# Patient Record
Sex: FEMALE | Race: WHITE | NOT HISPANIC OR LATINO | Employment: STUDENT | URBAN - METROPOLITAN AREA
[De-identification: names, ages, dates, MRNs, and addresses within clinical notes are randomized per-mention and may not be internally consistent; named-entity substitution may affect disease eponyms.]

---
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
## 2023-11-13 ENCOUNTER — TELEPHONE (OUTPATIENT)
Dept: NEUROSURGERY | Facility: CLINIC | Age: 18
End: 2023-11-13
Payer: MEDICARE

## 2023-11-13 NOTE — TELEPHONE ENCOUNTER
----- Message from Luda Duke sent at 11/13/2023  9:27 AM CST -----  Type:  Patient Returning Call    Who Called:pt   Who Left Message for Patient:  Does the patient know what this is regarding?:appt reschedule   Would the patient rather a call back or a response via [facility]? [facility]   Best Call Back Number:phone is broken   Additional Information: pt would like to reschedule appt

## 2023-11-13 NOTE — TELEPHONE ENCOUNTER
Lvm with mother of the patient, appt has been RSLD to Monday, 11-28 at 1:00 pm. Appt slip is in the mail.

## 2024-04-02 ENCOUNTER — OFFICE VISIT (OUTPATIENT)
Dept: NEUROSURGERY | Facility: CLINIC | Age: 19
End: 2024-04-02
Payer: MEDICARE

## 2024-04-02 DIAGNOSIS — G80.9 CEREBRAL PALSY, UNSPECIFIED TYPE: ICD-10-CM

## 2024-04-02 DIAGNOSIS — Q03.9 CONGENITAL HYDROCEPHALUS: Primary | ICD-10-CM

## 2024-04-02 DIAGNOSIS — Z98.2 VP (VENTRICULOPERITONEAL) SHUNT STATUS: ICD-10-CM

## 2024-04-02 PROCEDURE — 99213 OFFICE O/P EST LOW 20 MIN: CPT | Mod: PBBFAC | Performed by: PHYSICIAN ASSISTANT

## 2024-04-02 PROCEDURE — 99999 PR PBB SHADOW E&M-EST. PATIENT-LVL III: CPT | Mod: PBBFAC,,, | Performed by: PHYSICIAN ASSISTANT

## 2024-04-02 PROCEDURE — 99204 OFFICE O/P NEW MOD 45 MIN: CPT | Mod: S$PBB,,, | Performed by: PHYSICIAN ASSISTANT

## 2024-04-02 RX ORDER — BACLOFEN 20 MG/1
1 TABLET ORAL 2 TIMES DAILY
COMMUNITY
Start: 2023-12-15

## 2024-04-02 RX ORDER — LUMATEPERONE 42 MG/1
CAPSULE ORAL
COMMUNITY
Start: 2023-06-30

## 2024-04-02 RX ORDER — ALPRAZOLAM 3 MG/1
3 TABLET, EXTENDED RELEASE ORAL DAILY
COMMUNITY
Start: 2024-01-27

## 2024-04-02 RX ORDER — LEVONORGESTREL / ETHINYL ESTRADIOL AND ETHINYL ESTRADIOL 150-30(84)
1 KIT ORAL DAILY
COMMUNITY
Start: 2024-03-26

## 2024-04-02 RX ORDER — OXCARBAZEPINE 150 MG/1
1 TABLET, FILM COATED ORAL 2 TIMES DAILY
COMMUNITY
Start: 2023-06-30

## 2024-04-02 RX ORDER — DIAZEPAM 20 MG/4G
GEL RECTAL
COMMUNITY
Start: 2023-05-27

## 2024-04-02 RX ORDER — ASCORBIC ACID 125 MG
1000 TABLET,CHEWABLE ORAL
COMMUNITY

## 2024-04-02 RX ORDER — MOMETASONE FUROATE 1 MG/ML
SOLUTION TOPICAL
COMMUNITY
Start: 2023-08-17

## 2024-04-02 RX ORDER — KETOCONAZOLE 20 MG/ML
SHAMPOO, SUSPENSION TOPICAL
COMMUNITY

## 2024-04-02 RX ORDER — FERROUS SULFATE 324(65)MG
324 TABLET, DELAYED RELEASE (ENTERIC COATED) ORAL
COMMUNITY

## 2024-04-02 RX ORDER — DIAZEPAM 5 MG/1
5 TABLET ORAL 2 TIMES DAILY PRN
COMMUNITY
Start: 2023-06-30

## 2024-04-02 NOTE — PROGRESS NOTES
Neurosurgery  New Patient    SUBJECTIVE:     History of Present Illness:  18-year-old female with history of cerebral palsy, congenital hydrocephalus with bilateral  shunt, and seizure disorder.  Patient here accompanied by her mother, who states overall she has been doing well at her baseline, they are here to establish care.  Patient was previously patient of Dr. Cardoza in [place].  Patient had shunt placement initially placed by Dr. Cardoza at birth, and ultimately a 2nd shunt placed at 8 months of age.  Since that time has not required further  shunt revisions.  Patient is followed by Neurology at outside facility.  Mom states that seizures have been well controlled and not had a seizure in years.  Patient with some complaints of intermittent floaters in vision. Denies constant/progressive HAs, blurred vision or diplopia, speech difficulty, weakness, numbness, seizures, or balance difficulty.       Review of patient's allergies indicates:  No Known Allergies    Current Outpatient Medications   Medication Sig Dispense Refill    ALPRAZolam (XANAX XR) 3 MG Tb24 Take 3 mg by mouth once daily.      baclofen (LIORESAL) 20 MG tablet Take 1 tablet by mouth 2 (two) times daily.      CAPLYTA 42 mg Cap TAKE 1 CAPSULE BY MOUTH DAILY AT BEDTIME      cholecalciferol, vitamin D3, 25 mcg (1,000 unit) Chew Take 1,000 Units by mouth.      cloBAZam (ONFI) 10 mg Tab Take 10 mg by mouth 2 (two) times daily.      ferrous sulfate 324 mg (65 mg iron) TbEC Take 324 mg by mouth with breakfast.      ketoconazole (NIZORAL) 2 % shampoo APPLY TO THE SCALP EVERY OTHER DAY, LATHER, SIT, RINSE NEED APPOINTMENT      L norgest/e.estradioL-e.estrad (SEASONIQUE) 0.15 mg-30 mcg (84)/10 mcg (7) 3MPk Take 1 tablet by mouth once daily.      mometasone (ELOCON) 0.1 % solution apply 1ml to the scalp TWICE DAILY FOR TWO WEEKS, then AS NEEDED      OXcarbazepine (TRILEPTAL) 150 MG Tab Take 1 tablet by mouth 2 (two) times daily.      oxcarbazepine  (TRILEPTAL) 600 MG Tab Take 150 mg by mouth 2 (two) times daily. Take 600 mg and 150 mg tablet      pantoprazole (PROTONIX) 40 MG tablet Take 40 mg by mouth once daily.      potassium citrate (UROCIT-K) 10 mEq (1,080 mg) TbSR Take 10 mEq by mouth 2 (two) times daily.      propranoloL (INDERAL) 60 MG tablet Take 60 mg by mouth 2 (two) times a day.      sertraline (ZOLOFT) 100 MG tablet Take 200 mg by mouth every evening.      celecoxib (CELEBREX) 100 MG capsule Take 1 capsule (100 mg total) by mouth once daily. (Patient not taking: Reported on 10/11/2022) 30 capsule 0    diazePAM (DIASTAT ACUDIAL) 12.5-15-17.5-20 mg Kit 20 mg per rectum if seizure lasts more than 5 minutes      diazePAM (VALIUM) 5 MG tablet Take 5 mg by mouth 2 (two) times daily as needed.      Lactobacillus rhamnosus GG (CULTURELLE) 10 billion cell capsule Take 1 capsule by mouth once daily. (Patient not taking: Reported on 10/11/2022)      ondansetron (ZOFRAN-ODT) 8 MG TbDL Take 1 tablet (8 mg total) by mouth every 6 (six) hours as needed. (Patient not taking: Reported on 10/11/2022) 30 tablet 0    oxycodone-acetaminophen (PERCOCET) 5-325 mg per tablet Take 1-2 tablets by mouth every 4 (four) hours as needed for Pain. (Patient not taking: Reported on 10/11/2022) 40 tablet 0    polyethylene glycol (GLYCOLAX) 17 gram PwPk Take by mouth daily as needed.      ziprasidone (GEODON) 20 MG Cap Take 20 mg by mouth 2 (two) times daily. @@ 3 pm        No current facility-administered medications for this visit.       Past Medical History:   Diagnosis Date    ADHD (attention deficit hyperactivity disorder)     Cerebral palsy     Chronic lung disease of prematurity     Hypoxemia     IVH (intraventricular hemorrhage)     Overweight     Pneumonia     Aspiration    Premature infant of 27 weeks gestation     Seizure     last one in December 2013     Past Surgical History:   Procedure Laterality Date    ADENOIDECTOMY      CSF SHUNT      times 2    OTHER SURGICAL  HISTORY  05/15/2013    Botox injection under General anesthesia    Tendon transfers      TYMPANOSTOMY TUBE PLACEMENT       Family History       Problem Relation (Age of Onset)    Cancer Father          Social History     Socioeconomic History    Marital status: Single   Tobacco Use    Smoking status: Never    Smokeless tobacco: Never   Substance and Sexual Activity    Alcohol use: No    Drug use: No    Sexual activity: Never   Social History Narrative    Lives c mom.       OBJECTIVE:       Neurosurgery Physical Exam   General: Awake, Alert, Oriented to person and place, NAD.  Baseline developmental delay.   Palpable B/L shunt valves, non tender.   Follow commands B/L  Cranial nerves: Baseline dysconjugate gaze, otherwise II- XII are grossly intact  Sensory: response to light touch throughout  Motor Strength:Humberto, anti-gravity x 4, baseline right hand contracture.  B/L AFO brace in place  Wheelchair bound    Diagnostic Results: personally reviewed  1.  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    2.  No evidence of any cerebellar tonsillar ectopia.    3.  Minimal leftward shift of the dysplastic lateral ventricles is probably related to some asymmetric atrophy in the left cerebral hemisphere.  Narrative    MRI BRAIN WO CONTRAST    CLINICAL INDICATION:  ha w/  shunt      COMPARISON: 9/10/2015 brain MRI    TECHNIQUE: T2 haste sequences were obtained in all three imaging planes through the brain utilizing our FAST scan protocol, which is meant for specific evaluation of the lateral ventricles and extra-axial spaces and is limited in its usefulness for evaluation of the remainder of the brain.    FINDINGS: There is at least  partial agenesis of the corpus callosum. There are bilateral posterior parietal approach ventriculostomy tubes which are present within the posterior bodies of the slit-like lateral ventricles, although there is once short segment of the anterior body of the left lateral ventricle which appears asymmetrically dilated, as seen on series 2, image 24, although this is less prominent than that seen on the 2/13/2012 brain CT but is unchanged since 9/10/2015 brain MRI. There is minimal leftward shift of the third ventricle, measuring 5 mm.    There are low-lying cerebellar tonsils which are at the inferior margin of the foramen magnum, with no evidence of any cerebellar tonsillar ectopia.    There is no abnormal T2 signal within the cerebral parenchyma to suggest any acute hemorrhage.    The mastoid antra and middle ear cavities are clear. The paranasal sinuses are clear.  Exam End: 02/24/22 06:58 Last Resulted: 02/24/22 07:30   Received From: [facility]  Result Received: 10/23/23 11:16       ASSESSMENT/PLAN:     18-year-old female with history of cerebral palsy, seizure disorder, and congenital hydrocephalus with bilateral  shunts last revised at 8 months of age.  Patient neurologically stable at baseline.  We will get updated head CT and shunt series.  At see her back after this is done. Encouraged to call the clinic with any questions or concerns in the meantime.      Keaton Cano Jr. JOSE ANGEL  [facility]  Department of Neurosurgery     Note dictated with voice recognition software, please excuse any grammatical errors.

## 2024-04-03 ENCOUNTER — TELEPHONE (OUTPATIENT)
Dept: NEUROSURGERY | Facility: CLINIC | Age: 19
End: 2024-04-03
Payer: MEDICARE

## 2024-04-03 DIAGNOSIS — G80.9 CEREBRAL PALSY, UNSPECIFIED TYPE: ICD-10-CM

## 2024-04-03 DIAGNOSIS — Q03.9 CONGENITAL HYDROCEPHALUS: Primary | ICD-10-CM

## 2024-04-03 DIAGNOSIS — Z98.2 VP (VENTRICULOPERITONEAL) SHUNT STATUS: ICD-10-CM

## 2024-04-22 ENCOUNTER — HOSPITAL ENCOUNTER (OUTPATIENT)
Dept: RADIOLOGY | Facility: HOSPITAL | Age: 19
Discharge: HOME | End: 2024-04-22
Attending: PHYSICIAN ASSISTANT
Payer: MEDICARE

## 2024-04-22 DIAGNOSIS — G80.9 CEREBRAL PALSY, UNSPECIFIED TYPE: ICD-10-CM

## 2024-04-22 DIAGNOSIS — Z98.2 VP (VENTRICULOPERITONEAL) SHUNT STATUS: ICD-10-CM

## 2024-04-22 DIAGNOSIS — Q03.9 CONGENITAL HYDROCEPHALUS: ICD-10-CM

## 2024-04-22 PROCEDURE — 74018 RADEX ABDOMEN 1 VIEW: CPT | Mod: 26,,, | Performed by: RADIOLOGY

## 2024-04-22 PROCEDURE — 72020 X-RAY EXAM OF SPINE 1 VIEW: CPT | Mod: 26,,, | Performed by: RADIOLOGY

## 2024-04-22 PROCEDURE — 71045 X-RAY EXAM CHEST 1 VIEW: CPT | Mod: TC

## 2024-04-22 PROCEDURE — 71045 X-RAY EXAM CHEST 1 VIEW: CPT | Mod: 26,,, | Performed by: RADIOLOGY

## 2024-04-22 PROCEDURE — 72020 X-RAY EXAM OF SPINE 1 VIEW: CPT | Mod: TC

## 2024-04-22 PROCEDURE — 70250 X-RAY EXAM OF SKULL: CPT | Mod: 26,,, | Performed by: RADIOLOGY

## 2024-10-03 ENCOUNTER — OFFICE VISIT (OUTPATIENT)
Dept: PHYSICAL MEDICINE AND REHAB | Facility: CLINIC | Age: 19
End: 2024-10-03
Payer: MEDICARE

## 2024-10-03 VITALS — DIASTOLIC BLOOD PRESSURE: 55 MMHG | HEART RATE: 79 BPM | SYSTOLIC BLOOD PRESSURE: 117 MMHG

## 2024-10-03 DIAGNOSIS — G80.1 SPASTIC CEREBRAL PALSY: Primary | ICD-10-CM

## 2024-10-03 DIAGNOSIS — G80.8: ICD-10-CM

## 2024-10-03 DIAGNOSIS — Z87.898 HISTORY OF SEIZURES: ICD-10-CM

## 2024-10-03 PROCEDURE — 99214 OFFICE O/P EST MOD 30 MIN: CPT | Mod: PBBFAC,PO | Performed by: PHYSICAL MEDICINE & REHABILITATION

## 2024-10-03 PROCEDURE — 99999 PR PBB SHADOW E&M-EST. PATIENT-LVL IV: CPT | Mod: PBBFAC,,, | Performed by: PHYSICAL MEDICINE & REHABILITATION

## 2024-10-03 NOTE — PROGRESS NOTES
[facility] VISIT     Primary Care Provider: Delmer Kelly MD     CHIEF COMPLAINT:   1.   Chief Complaint   Patient presents with    contractures     Right hand     2. Spasticity management recommendations.     HISTORY OF PRESENT ILLNESS: Abril Duncan is a 19 y.o.-year-old female with a history of cerebral palsy  with late effect spastic quadriparesis and cognitive delay who presents today for evaluation and recommendations regarding spasticity management.     They are here today with mother, who helps provide history.     Reports, patients biggest complaints is pain to her right knee, worse with standing but also pain at rest in chair. Knee use to pop out with activities but improved since ortho repair but not able to get great use of her leg. This was made worse after tendon lengthening procedures to BLE (unsure of where). Her right knee hyperextends with standing/pivot transfers. She has been non-ambulatory for 3 years since her last tendon procedures. Using rigid AFOs which she has concerns that she cannot walk with these. Mothers main concern is her lack of mobility as well her right hand contracture.     Mental health: psychiatrist Colomb  Seizures: last episode 2015, well managed at this time. Dr. Gamez    Medications: oral baclofen    Injections:  - years ago Botox BLE 2014  Surgical procedures:  - CSF shunt  - BLE tendon lengthening    EQUIPMENT:  Braces: custom AFOs -  (? 2023)  Wheelchair: MW - [employer]; also has a newer MWC (2023) from [employer] but not using it   Walker: none    MOBILITY/TRANSFERS:  Walking: non-ambulatory, just stand pivot transfers     ACTIVITIES OF DAILY LIVING:  Upper extremity dressing: dep  Lower extremity dressing: dep  Bathe: dep  Groom: dep  Toilet: dep    THERAPY/LOCATION:  PT: none currently  OT: none currently  Speech: none currently    LIVING SITUATION: lives with mother, single story home, 0 SHIRA, roll in  shower    PAST MEDICAL HISTORY:  Past Medical History:   Diagnosis Date    ADHD (attention deficit hyperactivity disorder)     Cerebral palsy     Chronic lung disease of prematurity     Hypoxemia     IVH (intraventricular hemorrhage)     Overweight     Pneumonia     Aspiration    Premature infant of 27 weeks gestation     Seizure     last one in December 2013        PAST SURGICAL HISTORY:   Past Surgical History:   Procedure Laterality Date    ADENOIDECTOMY      CSF SHUNT      times 2    OTHER SURGICAL HISTORY  05/15/2013    Botox injection under General anesthesia    Tendon transfers      TYMPANOSTOMY TUBE PLACEMENT          FAMILY HISTORY:   Family History   Problem Relation Name Age of Onset    Cancer Father         SOCIAL HISTORY:    Social History     Socioeconomic History    Marital status: Single   Tobacco Use    Smoking status: Never    Smokeless tobacco: Never   Substance and Sexual Activity    Alcohol use: No    Drug use: No    Sexual activity: Never   Social History Narrative    Lives c mom.       MEDICATIONS:     Current Outpatient Medications:     ALPRAZolam (XANAX XR) 3 MG Tb24, Take 3 mg by mouth once daily., Disp: , Rfl:     baclofen (LIORESAL) 20 MG tablet, Take 1 tablet by mouth 2 (two) times daily., Disp: , Rfl:     CAPLYTA 42 mg Cap, TAKE 1 CAPSULE BY MOUTH DAILY AT BEDTIME, Disp: , Rfl:     celecoxib (CELEBREX) 100 MG capsule, Take 1 capsule (100 mg total) by mouth once daily. (Patient not taking: Reported on 10/11/2022), Disp: 30 capsule, Rfl: 0    cholecalciferol, vitamin D3, 25 mcg (1,000 unit) Chew, Take 1,000 Units by mouth., Disp: , Rfl:     cloBAZam (ONFI) 10 mg Tab, Take 10 mg by mouth 2 (two) times daily., Disp: , Rfl:     diazePAM (DIASTAT ACUDIAL) 12.5-15-17.5-20 mg Kit, 20 mg per rectum if seizure lasts more than 5 minutes, Disp: , Rfl:     diazePAM (VALIUM) 5 MG tablet, Take 5 mg by mouth 2 (two) times daily as needed., Disp: , Rfl:     ferrous sulfate 324 mg (65 mg iron) TbEC, Take  324 mg by mouth with breakfast., Disp: , Rfl:     ketoconazole (NIZORAL) 2 % shampoo, APPLY TO THE SCALP EVERY OTHER DAY, LATHER, SIT, RINSE NEED APPOINTMENT, Disp: , Rfl:     L norgest/e.estradioL-e.estrad (SEASONIQUE) 0.15 mg-30 mcg (84)/10 mcg (7) 3MPk, Take 1 tablet by mouth once daily., Disp: , Rfl:     Lactobacillus rhamnosus GG (CULTURELLE) 10 billion cell capsule, Take 1 capsule by mouth once daily. (Patient not taking: Reported on 10/11/2022), Disp: , Rfl:     mometasone (ELOCON) 0.1 % solution, apply 1ml to the scalp TWICE DAILY FOR TWO WEEKS, then AS NEEDED, Disp: , Rfl:     ondansetron (ZOFRAN-ODT) 8 MG TbDL, Take 1 tablet (8 mg total) by mouth every 6 (six) hours as needed. (Patient not taking: Reported on 10/11/2022), Disp: 30 tablet, Rfl: 0    OXcarbazepine (TRILEPTAL) 150 MG Tab, Take 1 tablet by mouth 2 (two) times daily., Disp: , Rfl:     oxcarbazepine (TRILEPTAL) 600 MG Tab, Take 150 mg by mouth 2 (two) times daily. Take 600 mg and 150 mg tablet, Disp: , Rfl:     oxycodone-acetaminophen (PERCOCET) 5-325 mg per tablet, Take 1-2 tablets by mouth every 4 (four) hours as needed for Pain. (Patient not taking: Reported on 10/11/2022), Disp: 40 tablet, Rfl: 0    pantoprazole (PROTONIX) 40 MG tablet, Take 40 mg by mouth once daily., Disp: , Rfl:     polyethylene glycol (GLYCOLAX) 17 gram PwPk, Take by mouth daily as needed., Disp: , Rfl:     potassium citrate (UROCIT-K) 10 mEq (1,080 mg) TbSR, Take 10 mEq by mouth 2 (two) times daily., Disp: , Rfl:     propranoloL (INDERAL) 60 MG tablet, Take 60 mg by mouth 2 (two) times a day., Disp: , Rfl:     sertraline (ZOLOFT) 100 MG tablet, Take 200 mg by mouth every evening., Disp: , Rfl:     ziprasidone (GEODON) 20 MG Cap, Take 20 mg by mouth 2 (two) times daily. @@ 3 pm , Disp: , Rfl:      ALLERGIES:   Review of patient's allergies indicates:  No Known Allergies    REVIEW OF SYSTEMS: Denies coughs, colds, fevers, chills. No constipation. Bowel movements are  regular. No dysphagia. No weight, appetite or sleep concerns. +behavior concerns. No drooling or difficulty handling oral secretions. No skin lesions.       PHYSICAL EXAMINATION:   VITALS:   Vitals:    10/03/24 1040   BP: (!) 117/55   Pulse: 79     GENERAL: The patient is awake, alert, cooperative, and in no acute distress. Flat affect, reserved at times. Laughs at jokes  HEENT: Normocephalic, atraumatic. Tracking is in all 4 quadrants. No facial asymmetry. Uvula is midline.   NECK: Supple. No lymphadenopathy. No masses. Full range of motion. No torticollis.   HEART: Appears well perfused. No lower extremity edema.   LUNGS: No increased work of breath.   ABDOMEN: Benign.   NEURO: Cranial nerves II-XII are grossly intact by observation.   EXTREMITIES: Warm, capillary refill less than 2 seconds. No clubbing, cyanosis or edema.   MUSCULOSKELETAL: atrophic changes to RUE and BLE. Right equinovarus deformity. Right UE held in wrist and finger flexion    NEUROMUSCULAR:   Modified Dante Scale:  1:  1+: right finger flexors  2: right wrist flexors, right ankle plantarflexors  3:  4:     Manual muscle testing: LUE with appropriate gross motor function    Appropriate sitting balance in [facility].   No dyskinetic or dystonic movements appreciated.   Muscle stretch reflexes are 2+ throughout both upper and lower extremities.   No clonus was elicited at either ankle.    GAIT/DYNAMIC: not attempted, patient refused transfer or standing at this time.     ASSESSMENT:   1. Spastic cerebral palsy        2. Triplegic cerebral palsy        3. History of seizures             Abril Duncan is a 19 y.o.-year-old female with a history of cerebral palsy  with late effect spastic quadriparesis and cognitive delay. The following recommendations and plan were discussed in depth with the patient who voiced understanding and was in agreement.     PLAN:   Spasticity:   - we discussed options for management at this time are with oral medication,  focal neurotoxin, focal cryoneurolysis with Iovera, and focal neurolysis with phenol as follows:   - continue oral regimen as previously prescribed, hx of seizures makes adjusting medications challenging. Defer to Neurology.   - discussed need for aggressive PT/OT to improve function in hopes to improve endurance and ability to utilitize focal treatment options.  - after improvements physically with PT/OT will perform phenol to the following nerves:  Right tibial nerve  - expect to repeat this every 6 months.   - if less than ideal response would consider Botox injections to the RLE as well for the Right hand/wrist contracture    Bracing:    - trialed adjustable wrist/hand orthosis in clinic today however patient was not interested in using at this time.     Equipment:   - continue MWC, noted difficulties with recent new MWC by previous provider however mother states she has not had good response with this company and wants to be evaluated by an another company that can come and assess her current equipment and recommend any changes/additions. Order placed for [facility] to evaluate and treat current equipment    Therapy:   - home health PT/OT ordered    I would like to have her  return to clinic in 2 months to assess resopnse of PT/OT and perform phenol to right tibial nerve.     64 minutes of total time spent on the encounter, which includes face to face time and non-face to face time preparing to see the patient (eg, review of tests), obtaining and/or reviewing separately obtained history, documenting clinical information in the electronic or other health record, independently interpreting results (not separately reported), communicating results to the patient/family/caregiver, and/or care coordination (not separately reported).

## 2024-10-08 PROCEDURE — G0180 MD CERTIFICATION HHA PATIENT: HCPCS | Mod: ,,, | Performed by: PHYSICAL MEDICINE & REHABILITATION

## 2024-11-08 ENCOUNTER — EXTERNAL HOME HEALTH (OUTPATIENT)
Dept: HOME HEALTH SERVICES | Facility: HOSPITAL | Age: 19
End: 2024-11-08
Payer: MEDICARE

## 2024-11-19 ENCOUNTER — DOCUMENT SCAN (OUTPATIENT)
Dept: HOME HEALTH SERVICES | Facility: HOSPITAL | Age: 19
End: 2024-11-19
Payer: MEDICARE

## 2024-12-05 ENCOUNTER — TELEPHONE (OUTPATIENT)
Dept: PHYSICAL MEDICINE AND REHAB | Facility: CLINIC | Age: 19
End: 2024-12-05
Payer: MEDICARE

## 2024-12-05 NOTE — TELEPHONE ENCOUNTER
"Spoke to patient's mother, states that patient saw Dr. Phillips in adult PMR clinic, injections were recommended and mother was concerned with nurse saying they would "hold" patient down for injections with "as many people as it takes." Mother further explained that patient is developmentally delayed, mentally around 4 years old, and scared of injections just as a child would be. Mother concerned that patient will have negative experience and asking to see Dr. London - patient saw him years ago and received injections under anesthesia.  Mother states that Dr. Phillips does all injections in office, no anesthesia involved. Advised mom that it typically is not "holding down" but more positioning arms/legs correctly in order to safely perform the injections while keeping patient comfortable and that Dr. London typically does not see adult patients anymore due to Dr. Phillips being our new adult PMR provider, but office could discuss with Dr. London for further guidance. Mother verbalized understanding.    ----- Message from [employer] sent at 12/5/2024 11:58 AM CST -----  Type: Needs Medical Advice  Who Called:  Rhonda ( mother )  Best Call Back Number: [phone]    Additional Information: mother would like a appointment  with above provider even thou she is no longer a peds patient , rhonda states her daughter has a mind of 4 yr old disability  problem  she is carmela please call to further discuss thank you .  "

## 2024-12-07 PROCEDURE — G0179 MD RECERTIFICATION HHA PT: HCPCS | Mod: ,,, | Performed by: PHYSICAL MEDICINE & REHABILITATION

## 2024-12-27 ENCOUNTER — DOCUMENT SCAN (OUTPATIENT)
Dept: HOME HEALTH SERVICES | Facility: HOSPITAL | Age: 19
End: 2024-12-27
Payer: MEDICARE

## 2025-01-10 ENCOUNTER — DOCUMENT SCAN (OUTPATIENT)
Dept: HOME HEALTH SERVICES | Facility: HOSPITAL | Age: 20
End: 2025-01-10
Payer: MEDICARE

## 2025-02-07 ENCOUNTER — EXTERNAL HOME HEALTH (OUTPATIENT)
Dept: HOME HEALTH SERVICES | Facility: HOSPITAL | Age: 20
End: 2025-02-07
Payer: MEDICARE

## 2025-02-10 ENCOUNTER — DOCUMENT SCAN (OUTPATIENT)
Dept: HOME HEALTH SERVICES | Facility: HOSPITAL | Age: 20
End: 2025-02-10
Payer: MEDICARE

## 2025-04-15 ENCOUNTER — TELEPHONE (OUTPATIENT)
Dept: NEUROSURGERY | Facility: CLINIC | Age: 20
End: 2025-04-15
Payer: MEDICARE

## 2025-04-15 DIAGNOSIS — Z98.2 VP (VENTRICULOPERITONEAL) SHUNT STATUS: ICD-10-CM

## 2025-04-15 DIAGNOSIS — Q03.9 CONGENITAL HYDROCEPHALUS: ICD-10-CM

## 2025-04-15 DIAGNOSIS — G80.9 CEREBRAL PALSY, UNSPECIFIED TYPE: Primary | ICD-10-CM

## 2025-05-27 ENCOUNTER — HOSPITAL ENCOUNTER (OUTPATIENT)
Dept: RADIOLOGY | Facility: HOSPITAL | Age: 20
Discharge: HOME | End: 2025-05-27
Attending: PHYSICIAN ASSISTANT
Payer: MEDICAID

## 2025-05-27 ENCOUNTER — OFFICE VISIT (OUTPATIENT)
Dept: NEUROSURGERY | Facility: CLINIC | Age: 20
End: 2025-05-27
Payer: MEDICARE

## 2025-05-27 DIAGNOSIS — G80.9 CEREBRAL PALSY, UNSPECIFIED TYPE: ICD-10-CM

## 2025-05-27 DIAGNOSIS — Q03.9 CONGENITAL HYDROCEPHALUS: ICD-10-CM

## 2025-05-27 DIAGNOSIS — Z98.2 VP (VENTRICULOPERITONEAL) SHUNT STATUS: ICD-10-CM

## 2025-05-27 DIAGNOSIS — G80.9 CEREBRAL PALSY, UNSPECIFIED TYPE: Primary | ICD-10-CM

## 2025-05-27 PROCEDURE — 71045 X-RAY EXAM CHEST 1 VIEW: CPT | Mod: 26,,, | Performed by: RADIOLOGY

## 2025-05-27 PROCEDURE — 99214 OFFICE O/P EST MOD 30 MIN: CPT | Mod: PBBFAC,25 | Performed by: PHYSICIAN ASSISTANT

## 2025-05-27 PROCEDURE — 70450 CT HEAD/BRAIN W/O DYE: CPT | Mod: TC

## 2025-05-27 PROCEDURE — 72020 X-RAY EXAM OF SPINE 1 VIEW: CPT | Mod: 26,,, | Performed by: RADIOLOGY

## 2025-05-27 PROCEDURE — 70450 CT HEAD/BRAIN W/O DYE: CPT | Mod: 26,,, | Performed by: RADIOLOGY

## 2025-05-27 PROCEDURE — 99213 OFFICE O/P EST LOW 20 MIN: CPT | Mod: S$PBB,,, | Performed by: PHYSICIAN ASSISTANT

## 2025-05-27 PROCEDURE — 99999 PR PBB SHADOW E&M-EST. PATIENT-LVL IV: CPT | Mod: PBBFAC,,, | Performed by: PHYSICIAN ASSISTANT

## 2025-05-27 PROCEDURE — 70250 X-RAY EXAM OF SKULL: CPT | Mod: TC

## 2025-05-27 PROCEDURE — 74018 RADEX ABDOMEN 1 VIEW: CPT | Mod: 26,,, | Performed by: RADIOLOGY

## 2025-05-27 PROCEDURE — 70250 X-RAY EXAM OF SKULL: CPT | Mod: 26,,, | Performed by: RADIOLOGY

## 2025-05-27 RX ORDER — MEDICAL SUPPLY, MISCELLANEOUS
MISCELLANEOUS MISCELLANEOUS
COMMUNITY
Start: 2025-01-28

## 2025-05-27 RX ORDER — PREDNISONE 20 MG/1
TABLET ORAL
COMMUNITY
Start: 2025-02-05

## 2025-05-27 RX ORDER — GUANFACINE 2 MG/1
1 TABLET, EXTENDED RELEASE ORAL NIGHTLY
COMMUNITY
Start: 2025-05-14

## 2025-05-27 RX ORDER — METHOCARBAMOL 500 MG/1
500 TABLET, FILM COATED ORAL 2 TIMES DAILY PRN
COMMUNITY
Start: 2025-02-05

## 2025-05-27 RX ORDER — BISMUTH SUBSALICYLATE 525 MG/30ML
30 LIQUID ORAL EVERY 6 HOURS PRN
COMMUNITY

## 2025-05-27 RX ORDER — IBUPROFEN 400 MG/1
400 TABLET, FILM COATED ORAL EVERY 6 HOURS PRN
COMMUNITY

## 2025-05-27 RX ORDER — TERCONAZOLE 8 MG/G
1 CREAM VAGINAL
COMMUNITY
Start: 2025-03-28

## 2025-05-27 NOTE — PROGRESS NOTES
Neurosurgery  Established Patient    SUBJECTIVE:     History of Present Illness:  Abril is here today for follow up with updated head CT and shunt series.  Patient is accompanied by her mother who provides the history.  She reports that overall since last visit they have been doing fairly well.  She does state that Abril has had over the past 6 months a bit more frequent episodes where she has staring, and patient feels as though might be seizures.  She is following with Neurology back home in [place], she reports that from their evaluation do not think these episodes are consistent with seizures.  Otherwise denies any complaints. Denies HA, visual changes, new, weakness or numbness, seizures..     History of Present Illness (4/2/2024):  18-year-old female with history of cerebral palsy, congenital hydrocephalus with bilateral  shunt, and seizure disorder.  Patient here accompanied by her mother, who states overall she has been doing well at her baseline, they are here to establish care.  Patient was previously patient of Dr. Cardoza in [place].  Patient had shunt placement initially placed by Dr. Cardoza at birth, and ultimately a 2nd shunt placed at 8 months of age.  Since that time has not required further  shunt revisions.  Patient is followed by Neurology at outside facility.  Mom states that seizures have been well controlled and not had a seizure in years.  Patient with some complaints of intermittent floaters in vision. Denies constant/progressive HAs, blurred vision or diplopia, speech difficulty, weakness, numbness, seizures, or balance difficulty.      Review of patient's allergies indicates:  No Known Allergies    Current Medications[1]    Past Medical History:   Diagnosis Date    ADHD (attention deficit hyperactivity disorder)     Cerebral palsy     Chronic lung disease of prematurity     Hypoxemia     IVH (intraventricular hemorrhage)     Overweight     Pneumonia     Aspiration     Premature infant of 27 weeks gestation     Seizure     last one in December 2013     Past Surgical History:   Procedure Laterality Date    ADENOIDECTOMY      CSF SHUNT      times 2    OTHER SURGICAL HISTORY  05/15/2013    Botox injection under General anesthesia    Tendon transfers      TYMPANOSTOMY TUBE PLACEMENT       Family History       Problem Relation (Age of Onset)    Cancer Father          Social History     Socioeconomic History    Marital status: Single   Tobacco Use    Smoking status: Never    Smokeless tobacco: Never   Substance and Sexual Activity    Alcohol use: No    Drug use: No    Sexual activity: Never   Social History Narrative    Lives c mom.       OBJECTIVE:     Neurosurgery Physical Exam   General: Awake, Alert, NAD.  Baseline developmental delay.   Cranial nerves: Baseline dysconjugate gaze, otherwise II- XII are grossly intact  Follow commands B/L  Motor Strength:Humberto, anti-gravity x 4, baseline right hand contracture.  B/L AFO brace in place  Wheelchair bound    Diagnostic Results: personally reviewed  EXAMINATION:  XR SHUNT SERIES (XPD)     CLINICAL HISTORY:  EVAL SHUNT LINE FOR MALFUNCTION;  Cerebral palsy, unspecified     TECHNIQUE:  AP and lateral views of head neck chest abdomen pelvis for  shunt study     COMPARISON:  04/22/2024     FINDINGS:  Bilateral posterior parietal  shunt valve settings appears stable and clinical correlation requested.  Continuity of valves appears stable.  Positioning of left  shunt intra-abdominal distal segment appears stable, contralateral side tip now and right lower flank region anteriorly.     Impression:     See results above.        Electronically signed by:Jean Carlos Leger MD  Date:                                            05/28/2025  Time:                                           07:46    EXAMINATION:  CT HEAD WITHOUT CONTRAST     CLINICAL HISTORY:  Hydrocephalus, shunted, follow up; SHUNT; Cerebral palsy, unspecified     TECHNIQUE:  Low  dose axial images were obtained through the head.  Coronal and sagittal reformations were also performed. Contrast was not administered.     COMPARISON:  Report of MRI brain 02/24/2022     FINDINGS:  Bilateral ventriculoperitoneal shunts.     No hydrocephalus although there is no direct study for comparison.     No intracranial hemorrhage or extra-axial collection.  No acute intraparenchymal process.  Gliotic changes left frontal white matter.     Partial agenesis of the corpus callosum.     Impression:     No prior study for comparison however no evidence of hydrocephalus with decompression of the ventricular system.  No acute intracranial process identified.        Electronically signed by:Delmer Montilla  Date:                                            05/27/2025  Time:                                           15:30    ASSESSMENT/PLAN:     19-year-old female with history of cerebral palsy, seizure disorder, and congenital hydrocephalus with bilateral  shunts last revised at 8 months of age.  Patient is neurologically stable. Head CT shows intact bilateral occipital  shunt with small configuration of ventricular system without evidence of hydrocephalus or acute intracranial abnormality.  Shunt series shows continuous tubing without event so discontinuity.  Continue to follow with Neurology for seizure management.  We will continue to follow from neurosurgical perspective on annual basis with updated head CT and shunt series. Encouraged to call the clinic with any questions or concerns in the meantime.      Keaton Cano Jr. JOSE ANGEL  [facility]  Department of Neurosurgery     Note dictated with voice recognition software, please excuse any grammatical errors.            [1]   Current Outpatient Medications   Medication Sig Dispense Refill    C-TUB Misc Dispense 1 patellar stabilization brace, right      C-TUB Misc Dispense 1 hinged AFO to each lower extremity (2 total)      guanFACINE (INTUNIV  ER) 2 mg Tb24 Take 1 tablet by mouth every evening.      methocarbamoL (ROBAXIN) 500 MG Tab Take 500 mg by mouth 2 (two) times daily as needed.      OXcarbazepine (TRILEPTAL) 150 MG Tab Take 1 tablet by mouth 2 (two) times daily.      oxcarbazepine (TRILEPTAL) 600 MG Tab Take 150 mg by mouth 2 (two) times daily. Take 600 mg and 150 mg tablet      predniSONE (DELTASONE) 20 MG tablet Take by mouth.      terconazole (TERAZOL 3) 0.8 % vaginal cream Place 1 Applicatorful vaginally.      ALPRAZolam (XANAX XR) 3 MG Tb24 Take 3 mg by mouth once daily.      baclofen (LIORESAL) 20 MG tablet Take 1 tablet by mouth 2 (two) times daily.      bismuth subsalicylate (PEPTO BISMOL) 262 mg/15 mL suspension Take 30 mLs by mouth every 6 (six) hours as needed.      CAPLYTA 42 mg Cap TAKE 1 CAPSULE BY MOUTH DAILY AT BEDTIME      celecoxib (CELEBREX) 100 MG capsule Take 1 capsule (100 mg total) by mouth once daily. (Patient not taking: Reported on 10/11/2022) 30 capsule 0    cholecalciferol, vitamin D3, 25 mcg (1,000 unit) Chew Take 1,000 Units by mouth.      cloBAZam (ONFI) 10 mg Tab Take 10 mg by mouth 2 (two) times daily.      diazePAM (DIASTAT ACUDIAL) 12.5-15-17.5-20 mg Kit 20 mg per rectum if seizure lasts more than 5 minutes      diazePAM (VALIUM) 5 MG tablet Take 5 mg by mouth 2 (two) times daily as needed.      ferrous sulfate 324 mg (65 mg iron) TbEC Take 324 mg by mouth with breakfast.      ibuprofen (ADVIL,MOTRIN) 400 MG tablet Take 400 mg by mouth every 6 (six) hours as needed.      ketoconazole (NIZORAL) 2 % shampoo APPLY TO THE SCALP EVERY OTHER DAY, LATHER, SIT, RINSE NEED APPOINTMENT      L norgest/e.estradioL-e.estrad (SEASONIQUE) 0.15 mg-30 mcg (84)/10 mcg (7) 3MPk Take 1 tablet by mouth once daily.      Lactobacillus rhamnosus GG (CULTURELLE) 10 billion cell capsule Take 1 capsule by mouth once daily. (Patient not taking: Reported on 10/11/2022)      mometasone (ELOCON) 0.1 % solution apply 1ml to the scalp TWICE DAILY  FOR TWO WEEKS, then AS NEEDED      ondansetron (ZOFRAN-ODT) 8 MG TbDL Take 1 tablet (8 mg total) by mouth every 6 (six) hours as needed. (Patient not taking: Reported on 10/11/2022) 30 tablet 0    oxycodone-acetaminophen (PERCOCET) 5-325 mg per tablet Take 1-2 tablets by mouth every 4 (four) hours as needed for Pain. (Patient not taking: Reported on 10/11/2022) 40 tablet 0    pantoprazole (PROTONIX) 40 MG tablet Take 40 mg by mouth once daily.      polyethylene glycol (GLYCOLAX) 17 gram PwPk Take by mouth daily as needed.      potassium citrate (UROCIT-K) 10 mEq (1,080 mg) TbSR Take 10 mEq by mouth 2 (two) times daily.      propranoloL (INDERAL) 60 MG tablet Take 60 mg by mouth 2 (two) times a day.      sertraline (ZOLOFT) 100 MG tablet Take 200 mg by mouth every evening.      ziprasidone (GEODON) 20 MG Cap Take 20 mg by mouth 2 (two) times daily. @@ 3 pm        No current facility-administered medications for this visit.

## (undated) DEVICE — Device

## (undated) DEVICE — TAPE UMBILICAL 10X1/8

## (undated) DEVICE — BRACE KNEE T SCOPE PREMIER

## (undated) DEVICE — PADDING CAST 4IN

## (undated) DEVICE — SUT LOOP HI-FI 20 WHT/BLU

## (undated) DEVICE — DRESSING XEROFORM FOIL PK 1X8

## (undated) DEVICE — DRAPE C ARM 42 X 120 10/BX

## (undated) DEVICE — BANDAGE ACE NON LATEX 4IN

## (undated) DEVICE — GAUZE SPONGE 4X4 12PLY

## (undated) DEVICE — DRESSING TRANS 4X4 TEGADERM

## (undated) DEVICE — SEE MEDLINE ITEM 152529

## (undated) DEVICE — HOOK SUTURE SPECTRUM II DISP

## (undated) DEVICE — SUT VICRYL PLUS 0 CT1 36IN

## (undated) DEVICE — DRESSING XEROFORM 1X8IN

## (undated) DEVICE — TUBE SUCTION YANKAUER

## (undated) DEVICE — APPLICATOR CHLORAPREP ORN 26ML

## (undated) DEVICE — SEE MEDLINE ITEM 157131

## (undated) DEVICE — SEE MEDLINE ITEM 157150

## (undated) DEVICE — SUT 0 VICRYL / CT-1

## (undated) DEVICE — NDL 18GA X1 1/2 REG BEVEL

## (undated) DEVICE — NDL STRAIGHT 4CM LEIBINGER

## (undated) DEVICE — LOOP VESSEL BLUE MAXI

## (undated) DEVICE — SUT MCRYL PLUS 4-0 PS2 27IN

## (undated) DEVICE — SUT MONOCRYL 4-0 PS-2

## (undated) DEVICE — DRAPE C-ARM I18X9X20

## (undated) DEVICE — DRAPE PLASTIC U 60X72

## (undated) DEVICE — STOCKINETTE TUBULAR 4X25

## (undated) DEVICE — SHUTTLE SUPER

## (undated) DEVICE — DRAPE STERI-DRAPE 1000 17X11IN

## (undated) DEVICE — PAD COLD THERAPY KNEE WRAP ON

## (undated) DEVICE — SUT VICRYL 3-0 27 SH

## (undated) DEVICE — SEE MEDLINE ITEM 146298

## (undated) DEVICE — TRAY MINOR ORTHO

## (undated) DEVICE — BIT DRILL CANN BADGER 5X229

## (undated) DEVICE — DERMABOND

## (undated) DEVICE — TUBE SET INFLOW/OUTFLOW